# Patient Record
Sex: MALE | Race: BLACK OR AFRICAN AMERICAN | Employment: UNEMPLOYED | URBAN - METROPOLITAN AREA
[De-identification: names, ages, dates, MRNs, and addresses within clinical notes are randomized per-mention and may not be internally consistent; named-entity substitution may affect disease eponyms.]

---

## 2020-07-23 ENCOUNTER — HOSPITAL ENCOUNTER (EMERGENCY)
Age: 30
Discharge: HOME | End: 2020-07-24
Attending: EMERGENCY MEDICINE

## 2020-07-23 DIAGNOSIS — R46.89 AGGRESSION: Primary | ICD-10-CM

## 2020-07-23 LAB
AMPHET UR QL SCN: NEGATIVE
ANION GAP SERPL CALC-SCNC: 7 MMOL/L (ref 3–18)
BARBITURATES UR QL SCN: NEGATIVE
BASOPHILS # BLD: 0 K/UL (ref 0–0.1)
BASOPHILS NFR BLD: 0 % (ref 0–2)
BENZODIAZ UR QL: NEGATIVE
BUN SERPL-MCNC: 19 MG/DL (ref 7–18)
BUN/CREAT SERPL: 22 (ref 12–20)
CALCIUM SERPL-MCNC: 9.1 MG/DL (ref 8.5–10.1)
CANNABINOIDS UR QL SCN: NEGATIVE
CHLORIDE SERPL-SCNC: 107 MMOL/L (ref 100–111)
CO2 SERPL-SCNC: 26 MMOL/L (ref 21–32)
COCAINE UR QL SCN: NEGATIVE
COVID-19 RAPID TEST, COVR: NOT DETECTED
CREAT SERPL-MCNC: 0.88 MG/DL (ref 0.6–1.3)
DIFFERENTIAL METHOD BLD: NORMAL
EOSINOPHIL # BLD: 0 K/UL (ref 0–0.4)
EOSINOPHIL NFR BLD: 0 % (ref 0–5)
ERYTHROCYTE [DISTWIDTH] IN BLOOD BY AUTOMATED COUNT: 13.7 % (ref 11.6–14.5)
ETHANOL SERPL-MCNC: <3 MG/DL (ref 0–3)
GLUCOSE SERPL-MCNC: 91 MG/DL (ref 74–99)
HCT VFR BLD AUTO: 45 % (ref 36–48)
HDSCOM,HDSCOM: NORMAL
HGB BLD-MCNC: 15.4 G/DL (ref 13–16)
LYMPHOCYTES # BLD: 2 K/UL (ref 0.9–3.6)
LYMPHOCYTES NFR BLD: 23 % (ref 21–52)
MCH RBC QN AUTO: 29.8 PG (ref 24–34)
MCHC RBC AUTO-ENTMCNC: 34.2 G/DL (ref 31–37)
MCV RBC AUTO: 87 FL (ref 74–97)
METHADONE UR QL: NEGATIVE
MONOCYTES # BLD: 0.9 K/UL (ref 0.05–1.2)
MONOCYTES NFR BLD: 10 % (ref 3–10)
NEUTS SEG # BLD: 6 K/UL (ref 1.8–8)
NEUTS SEG NFR BLD: 67 % (ref 40–73)
OPIATES UR QL: NEGATIVE
PCP UR QL: NEGATIVE
PLATELET # BLD AUTO: 230 K/UL (ref 135–420)
PMV BLD AUTO: 9.4 FL (ref 9.2–11.8)
POTASSIUM SERPL-SCNC: 4.2 MMOL/L (ref 3.5–5.5)
RBC # BLD AUTO: 5.17 M/UL (ref 4.7–5.5)
SODIUM SERPL-SCNC: 140 MMOL/L (ref 136–145)
SOURCE, COVRS: NORMAL
WBC # BLD AUTO: 9 K/UL (ref 4.6–13.2)

## 2020-07-23 PROCEDURE — 87086 URINE CULTURE/COLONY COUNT: CPT

## 2020-07-23 PROCEDURE — 80048 BASIC METABOLIC PNL TOTAL CA: CPT

## 2020-07-23 PROCEDURE — 87635 SARS-COV-2 COVID-19 AMP PRB: CPT

## 2020-07-23 PROCEDURE — 99284 EMERGENCY DEPT VISIT MOD MDM: CPT

## 2020-07-23 PROCEDURE — 80307 DRUG TEST PRSMV CHEM ANLYZR: CPT

## 2020-07-23 PROCEDURE — 85025 COMPLETE CBC W/AUTO DIFF WBC: CPT

## 2020-07-23 NOTE — ED PROVIDER NOTES
EMERGENCY DEPARTMENT HISTORY AND PHYSICAL EXAM    4:24 PM      Date: 7/23/2020  Patient Name: Meghna Ryan    History of Presenting Illness     Chief Complaint   Patient presents with   [address] Problem         History Provided By: Patient, Police    Additional History (Context): Meghna Ryan is a 34 y.o. male with hx of ADD who presents via police custody under an ECO. Per documentation, mother notes patient has had increased aggression and has threatened her with knives. Patient denies incident occurring. Denies SI, HI, visual or auditory hallucinations. Denies alcohol or drug use. PCP: None      Past History     Past Medical History:  History reviewed. No pertinent past medical history. Past Surgical History:  History reviewed. No pertinent surgical history. Family History:  History reviewed. No pertinent family history. Social History:  Social History     Tobacco Use    Smoking status: Current Some Day Smoker     Packs/day: 0.25   Substance Use Topics    Alcohol use: Not Currently    Drug use: Not Currently       Allergies:  Not on File      Review of Systems       Review of Systems   Constitutional: Negative for chills and fever. Respiratory: Negative for shortness of breath. Cardiovascular: Negative for chest pain. Gastrointestinal: Negative for abdominal pain, nausea and vomiting. Skin: Negative for rash. Neurological: Negative for weakness. Psychiatric/Behavioral: Positive for agitation and behavioral problems. All other systems reviewed and are negative. Physical Exam     Visit Vitals  /78 (BP 1 Location: Left arm, BP Patient Position: Sitting)   Pulse 100   Temp 98.6 °F (37 °C)   Resp 18   Ht 6' (1.829 m)   Wt 83 kg (183 lb)   SpO2 100%   BMI 24.82 kg/m²         Physical Exam  Vitals signs and nursing note reviewed. Constitutional:       General: He is not in acute distress. Appearance: Normal appearance. He is well-developed.  He is not ill-appearing or diaphoretic. HENT:      Head: Normocephalic and atraumatic. Neck:      Musculoskeletal: Normal range of motion and neck supple. Cardiovascular:      Rate and Rhythm: Normal rate and regular rhythm. Heart sounds: Normal heart sounds. No murmur. No friction rub. No gallop. Pulmonary:      Effort: Pulmonary effort is normal. No respiratory distress. Breath sounds: Normal breath sounds. No wheezing or rales. Musculoskeletal: Normal range of motion. Skin:     General: Skin is warm. Findings: No rash. Neurological:      General: No focal deficit present. Mental Status: He is alert and oriented to person, place, and time. Cranial Nerves: Cranial nerves are intact. Sensory: Sensation is intact. Motor: Motor function is intact. Coordination: Coordination is intact. Psychiatric:         Attention and Perception: Attention normal.         Mood and Affect: Mood normal.         Speech: Speech normal.         Behavior: Behavior normal.         Thought Content: Thought content normal.           Diagnostic Study Results     Labs -  No results found for this or any previous visit (from the past 12 hour(s)). Radiologic Studies -   No orders to display         Medical Decision Making   I am the first provider for this patient. I reviewed the vital signs, available nursing notes, past medical history, past surgical history, family history and social history. Vital Signs-Reviewed the patient's vital signs. Records Reviewed: Nursing Notes and Old Medical Records (Time of Review: 4:24 PM)    ED Course: Progress Notes, Reevaluation, and Consults:  PROGRESS NOTE:  6:16 PM   Patient care will be transferred to [facility], NP. Discussed available diagnostic results and care plan at length. Pending [facility] consultation, placement. Written by Sanjay Sprague PA-C  1017: Pt in no acute distress. Pt able to answer all questions.   Reports he does not know why he is handcuffed and that he does not need to go to psychiatric unit. Police is at the bedside. Patient denies being suicidal, homicidal, depressed, denies hallucinations. Patient denies any physical complaints. [facility] found placement for patient at [facility]. Patient will be taking in police custody. Patient stable at this time. Diagnosis     Clinical Impression:   1. Aggression        Disposition: Transferred to [facility]. Follow-up Information    None          Patient's Medications    No medications on file       Dictation disclaimer:  Please note that this dictation was completed with [employer], the computer voice recognition software. Quite often unanticipated grammatical, syntax, homophones, and other interpretive errors are inadvertently transcribed by the computer software. Please disregard these errors. Please excuse any errors that have escaped final proofreading.

## 2020-07-24 VITALS
OXYGEN SATURATION: 100 % | TEMPERATURE: 98.1 F | HEIGHT: 72 IN | WEIGHT: 183 LBS | SYSTOLIC BLOOD PRESSURE: 118 MMHG | BODY MASS INDEX: 24.79 KG/M2 | DIASTOLIC BLOOD PRESSURE: 69 MMHG | HEART RATE: 66 BPM | RESPIRATION RATE: 15 BRPM

## 2020-07-25 LAB
BACTERIA SPEC CULT: NORMAL
SERVICE CMNT-IMP: NORMAL

## 2025-01-29 ENCOUNTER — HOSPITAL ENCOUNTER (EMERGENCY)
Facility: HOSPITAL | Age: 35
Discharge: HOME | End: 2025-01-31
Attending: EMERGENCY MEDICINE
Payer: COMMERCIAL

## 2025-01-29 DIAGNOSIS — F20.9 SCHIZOPHRENIA, UNSPECIFIED TYPE (HCC): ICD-10-CM

## 2025-01-29 DIAGNOSIS — F23 ACUTE PSYCHOSIS (HCC): Primary | ICD-10-CM

## 2025-01-29 PROCEDURE — 99282 EMERGENCY DEPT VISIT SF MDM: CPT

## 2025-01-29 PROCEDURE — 90791 PSYCH DIAGNOSTIC EVALUATION: CPT

## 2025-01-29 NOTE — ED TRIAGE NOTES
Patient brought in by police on an emergency custody order for psychotic behavior.  Patient currently refusing any medical care he would not let the physician examine him he would not allow vital signs to be taken or lab work or provide a urine sample.

## 2025-01-29 NOTE — ED NOTES
PT still refusing medical workup. PT educated on importance of medical clearance to move forward with psychiatric clearance. PT states \"can you show me the paperwork and the policies of the legalities of removing my bodily fluids from my body\". PT educated on his rights to refuse and subsequent delay in care.

## 2025-01-29 NOTE — ED NOTES
Per [facility]B, bed search cannot be initiated until PT is medically cleared. PT refusing medical exam, blood work, and denying all medical complaints. Unable to obtain vital signs since arrival. Crisis made aware. Nursing supervisor made aware. PT uninterested in medical care at this time.     TDO paperwork, filed by Eleanor Walker, alleges the patient was \"catatonic\" and \"refusal of services\" led to petition of TDO. PT ambulatory on arrival, speaking in full sentences. PT provided with food and juice while in emergency department.

## 2025-01-29 NOTE — ED NOTES
PT refusing medical workup, vital signs, medical care. PT demanding food, water, and a room. PT states he is not going to eat or drink anything until he is in a room instead of in the hallway.

## 2025-01-29 NOTE — ED PROVIDER NOTES
EMERGENCY DEPARTMENT HISTORY AND PHYSICAL EXAM      Patient Name: Jc Argueta  MRN: [id number]  YOB: 1990  Provider: Karely Donahue MD  PCP: None, None   Time/Date of evaluation: 5:23 AM EST on 1/29/25    History of Presenting Illness     Chief Complaint   Patient presents with    Mental Health Problem       History Provided By: Patient and Law Enforcement     History (Narative):   Jc Argueta is a 34 y.o. male with a PMHX of ADHD and prior episodes of aggression  who presents to the emergency department   via law enforcement  C/O under an E CO.  He is refusing to take his medications while he is in halfway.  His ECO paperwork also states that he has been catatonic.    At this time, the patient is not really forthcoming with information.  He is refusing vitals or blood work.            Past History     Past Medical History:  No past medical history on file.    Past Surgical History:  No past surgical history on file.    Family History:  No family history on file.    Social History:  Social History     Tobacco Use    Smoking status: Some Days     Current packs/day: 0.25     Types: Cigarettes   Substance Use Topics    Alcohol use: Not Currently    Drug use: Not Currently       Medications:  No current facility-administered medications for this encounter.     No current outpatient medications on file.       Allergies:  No Known Allergies    Social Determinants of Health:  Social Determinants of Health     Tobacco Use: Medium Risk (3/3/2022)    Received from [employer]    Patient History     Smoking Tobacco Use: Former     Smokeless Tobacco Use: Never     Passive Exposure: Not on file   Alcohol Use: Not on file   Financial Resource Strain: Not on file   Food Insecurity: Not on file   Transportation Needs: Not on file   Physical Activity: Not on file   Stress: Not on file   Social Connections: Not on file   Intimate Partner Violence: Not on file   Depression: Not on file   Housing Stability: Not

## 2025-01-30 NOTE — ED NOTES
Patient is awake and alert in no acute distress at this time patient continues to refuse all vital signs or labs.  Cuffed to the bed via left wrist still being held on a TDO.  Patient requesting food at times ate everything offered to patient.

## 2025-01-30 NOTE — ED NOTES
I received the patient in turnover from Dr. Brittanie man at the end of his shift.  The patient is a 34-year-old male with past medical history significant for schizoaffective disorder, who was brought to the ED last evening under an E CO.  He was arrested but the charges were dropped and the police brought him under the ED.  The E CO was taken out by [facility]B.  His E CO was said that he was catatonic.  However, the patient is refusing all care and is not willing to be fully examined or have a blood draw.  The patient is talking, eating, and able to ambulate.  He is not catatonic at this time.    We are really unable to offer anything else for the patient unless [facility]B is willing to see the patient without a full labs.    I spoke with the  present with the patient.  The patient now has a TDO that has been executed.  He has a bed at [facility].    I spoke with ED leadership.  The patient is young and healthy otherwise.  Based on the exam that I have been able to do, he is not in any acute distress.  He has been eating and drinking.    The patient is medically cleared.  He appears medically well and the risk of sedation in order to do any further testing outweighs the benefit.             Karely oDnahue MD  01/29/25 2008

## 2025-01-30 NOTE — VIRTUAL HEALTH
Jc Argueta  [id number]  1990     Social Work Behavioral Health Crisis Assessment    01/29/25    Chief Complaint: Mental Health Problem    HPI: Patient is a 34 y.o. Black /  male who presents for mental health problem. Patient presented to the ED on 01/29/25 from police custody.    Per EMR review of ED encounter, patient presents for \"C/O under an E CO.  He is refusing to take his medications while he is in assisted.  His ECO paperwork also states that he has been catatonic., He was arrested but the charges were dropped and the police brought him under the ED. The E CO was taken out by [facility]. His E CO was said that he was catatonic. However, the patient is refusing all care and is not willing to be fully examined or have a blood draw. The patient is talking, eating, and able to ambulate. He is not catatonic at this time,I spoke with the  present with the patient. The patient now has a TDO that has been executed. He has a bed at [facility]  \"  Per chart review, patient presented to the ED on 7/23/2020 for \"police custody under an ECO. Per documentation, mother notes patient has had increased aggression and has threatened her with knives. Patient denies incident occurring.\"  Per EMR review, patient has previous diagnosis of ADHD and schizoaffective.   Received consult for psychosis. Reviewed EMR. Writer met with patient who was agreeable to assessment. Patient was standing the entire assessment and appeared very paranoid. He was guarded and withdrawn with refusing to answer majority of questions. His answers were primarily one worded or short sentences.  He is unable to contract for safety due to refusing to answer questions. His mental health history is unknown due to him not wanting to discuss it. His insight and judgement are severely impaired.     Patient stated reason for ED encounter today is \"I am here under a TDO, I don't know a  issued the TDO its only for 24-72

## 2025-01-31 PROCEDURE — 94761 N-INVAS EAR/PLS OXIMETRY MLT: CPT

## 2025-01-31 NOTE — ED NOTES
Pt sitting at end of stretcher,  eating.  Pt questioning why he cannot have plastic bags in his room

## 2025-01-31 NOTE — BSMART NOTE
Crisis Note: On rounds, patient refused to speak with this writer. Patient is TDO'd and will attend  court later this morning. [employer] officer is with the patient; will assist as needed.

## 2025-01-31 NOTE — ED NOTES
I received the patient in turnover from Dr. Escudero at the end of her shift.  The patient is a 34-year-old male with a history of schizophrenia, who is acutely psychotic.  He is here under TDO.  He is awaiting admission at this time.  The patient is medically cleared.     Karely Donahue MD  01/30/25 0832

## 2025-01-31 NOTE — ED NOTES
Pt sitting on end of stretcher,   pt currently eating from bags of chik bridger. Pt refusing to answer my questions

## 2025-01-31 NOTE — ED NOTES
Attempted to get vitals signs, pt refusing, stating,\"nobody asked you to come in here and do that. I'm getting ready to go\"  Pt stated, \"goodbye\" when RN asked why he was opposed to vital signs.

## 2025-01-31 NOTE — ED NOTES
7:53 AM :Pt care assumed from Dr. Donahue , ED provider. Pt complaint(s), current treatment plan, progression and available diagnostic results have been discussed thoroughly. The patient was seen and evaluated on my shift.   Rounding occurred: Yes  Intended Disposition: Admission  Pending diagnostic reports and/or labs (please list): None    Patient remains under an E CO.  He continues to refuse labs.  He is pending placement by community service Board.

## 2025-01-31 NOTE — ED PROVIDER NOTES
11:50 PM : Pt care transferred to Dr. Donahue, ED provider. History of patient complaint(s), available diagnostic reports and current treatment plan has been discussed thoroughly.   Bedside rounding on patient occured : yes  Intended disposition of patient : MH transfer  Pending diagnostics reports and/or labs (please list): Evaluation by CSB    Maria E Agrawal MD, MPH  Emergency Medicine Physician      ED Course as of 01/30/25 2350   Wed Jan 29, 2025   0721 Eb to [facility] 33 yo male pmh of schizophrenia / arrested cc- catatonia/ pt refusing meds/ plan- pending evaluation by csb and em leadership  [KS]   Thu Jan 30, 2025   1842 Turn over from BLADIMIR Chin who was initially refusing labs. Now medically cleared awaiting evaluation by CSB. [LK]   2336 Patient care turned over to Dr. Donahue. Patient medically cleared and awaiting evaluation by CSB. [LK]      ED Course User Index  [KS] David Dickinson MD  [LK] Maria E Agrawal MD Kalodner, Lauren B, MD  01/30/25 2951

## 2025-01-31 NOTE — ED NOTES
7:15 AM:Pt care assumed from Dr. Donahue , ED provider. Pt complaint(s), current treatment plan, progression and available diagnostic results have been discussed thoroughly. The patient was seen and evaluated on my shift.   Rounding occurred: Yes  Intended Disposition: Placement  Pending diagnostic reports and/or labs (please list): None    Patient resting comfortably on stretcher in no acute distress.   at bedside.    I counseled the patient on the possibility of obtaining labs to expedite his placement.  He continues to refuse any lab draws.    Patient has been medically cleared for behavioral assessment and placement by [facility] service Board.      12 PM patient resting comfortably on stretcher in no acute distress.    7:16 PM : Pt care transferred to Dr. Maria E Agrawal,ED provider. History of patient complaint(s), available diagnostic reports and current treatment plan has been discussed thoroughly.   Bedside rounding on patient occured : Yes .  Intended disposition of patient : Placement  Pending diagnostics reports and/or labs (please list): None         Arnulfo Rankin DO  01/30/25 1918

## 2025-01-31 NOTE — BH NOTE
Court hearing held for pt 01/31/2025. Court dismissed petition for involuntary commitment.     -Bi Pacheco,

## 2025-02-23 ENCOUNTER — HOSPITAL ENCOUNTER (EMERGENCY)
Facility: HOSPITAL | Age: 35
Discharge: LEFT BEFORE DISCHARGE | End: 2025-02-26
Attending: STUDENT IN AN ORGANIZED HEALTH CARE EDUCATION/TRAINING PROGRAM
Payer: COMMERCIAL

## 2025-02-23 DIAGNOSIS — F29 PSYCHOSIS, UNSPECIFIED PSYCHOSIS TYPE (HCC): Primary | ICD-10-CM

## 2025-02-23 PROCEDURE — 99281 EMR DPT VST MAYX REQ PHY/QHP: CPT

## 2025-02-23 NOTE — ED TRIAGE NOTES
Patient denies any complaints. Patient brought in by police as an ECO. Patient denies SI/HI. Patient appears unkept. Patient refusing all medical treatment.

## 2025-02-24 PROCEDURE — 94761 N-INVAS EAR/PLS OXIMETRY MLT: CPT

## 2025-02-24 NOTE — ED PROVIDER NOTES
ED continued care note    5:36 PM EST  Signed out to me by Dr. Dickinson, patient here with psychosis on a TDO.  Refusing all medical care including vital signs and lab work.  Administration aware    Reviewed at 5:36 PM EST  Patient Vitals for the past 12 hrs:   Resp   02/24/25 1025 18       ED Course as of 02/26/25 0656   Sun Feb 23, 2025   1909 Patient in no distress at the time of evaluation and refusing all medical interventions.  No medical TDO in place though there is a psychiatric TDO which has been reported to me as having been supported by the [facility].  However given the lack of evidence of emergent medical pathology will not obtain blood or urine against the patient's will. [CM]   Mon Feb 24, 2025   0623 Cm to [facility]s 33 yo male pmh of schizophrenia cc- failure to thrive ( unkempt) / refusing medical care at this time/ plan- pending re-evaluation.  [KS]   1219 Went to bedside.  Patient refusing medical screening exam at this time.  Patient is of sound mind, has capacity.  Will continue to evaluate. [KS]   1805 I spent a few minutes talking to the patient seeing if he needs anything he did not say a word to me nor make eye contact in regard me.  He demonstrates purposeful behavior eating his meal tray, very coordinated and goal directed about this.  It is possible he does have some psychosis elements.  No dangerous behavior witnessed right now discussed with the charge nurse to get vital signs and clearance labs. [CB]   Tue Feb 25, 2025   0638 H/o schizophrenia, TDO; medically cleared and pending placement. [LK]   Wed Feb 26, 2025   0656 Patient here on a temporary detainment order with psychosis not cooperative with any assessment.  No acute problems recently [CB]      ED Course User Index  [CB] Duy Ricks MD  [CM] Ronald Magallanes III, MD  [KS] David Dickinson MD  [LK] Alex Bear MD       Clinical Impression:   1. Psychosis, unspecified psychosis type (HCC)         Duy Ricks,

## 2025-02-24 NOTE — ED NOTES
Patient remains sitting up on stretcher, left hand cuffed. Officer at bedside. Patient continues to refuse to have anything done.

## 2025-02-24 NOTE — ED PROVIDER NOTES
EMERGENCY DEPARTMENT HISTORY AND PHYSICAL EXAM    7:09 PM      Date: 2/23/2025  Patient Name: Jc Argueta    History of Presenting Illness     Chief Complaint   Patient presents with    Mental Health Problem         History Provided By: Patient    Additional History (Context): Jc Argueta is a 34 y.o. male with No past medical history on file.but with reported past psychiatric history of schizophrenia presenting to the emergency department with a chief complaint of ECO then supported TDO.  Patient states he was at his apartment today when his mother came over for him to give her some money for [employer], he was then informed that [facility] had taken out and ECO against him because he smelled like urine.    PCP: None, None    No current facility-administered medications for this encounter.     No current outpatient medications on file.       Past History     Past Medical History:  No past medical history on file.    Past Surgical History:  No past surgical history on file.    Family History:  No family history on file.    Social History:  Social History     Tobacco Use    Smoking status: Some Days     Current packs/day: 0.25     Types: Cigarettes   Substance Use Topics    Alcohol use: Not Currently    Drug use: Not Currently       Allergies:  No Known Allergies      Review of Systems       Review of Systems   Constitutional:  Negative for chills and fever.   HENT:  Negative for rhinorrhea.    Eyes:  Negative for photophobia and visual disturbance.   Respiratory:  Negative for cough and shortness of breath.    Cardiovascular:  Negative for chest pain and palpitations.   Gastrointestinal:  Negative for nausea and vomiting.   Genitourinary:  Negative for dysuria.   Musculoskeletal:  Negative for neck pain and neck stiffness.   Skin:  Negative for rash and wound.   Neurological:  Negative for syncope and weakness.   Psychiatric/Behavioral:  Negative for suicidal ideas.          Physical Exam   There were no vitals  and other interpretive errors are inadvertently transcribed by the computer software.  Please disregard these errors.  Please excuse any errors that have escaped final proofreading.          Ronald Magallanes III, MD  02/25/25 5530

## 2025-02-24 NOTE — ED NOTES
Pt given lunch tray.  Pt refused vital signs and labs or to change into paper scrubs.  Pt states he is not here for medical care.  Pt guarded and uncooperative.

## 2025-02-24 NOTE — ED NOTES
Patient taken to bathroom by officer. Patient refusing to give urine sample. Patient continues to refuse blood draw and vital signs.

## 2025-02-24 NOTE — ED PROVIDER NOTES
ED continued care note    5:36 PM EST  Signed out to me by Dr. Dickinson, patient here with psychosis on a TDO.  Refusing all medical care including vital signs and lab work.  Administration aware    Reviewed at 5:36 PM EST  Patient Vitals for the past 12 hrs:   Resp   02/24/25 1025 18       ED Course as of 02/24/25 1736   Sun Feb 23, 2025   1909 Patient in no distress at the time of evaluation and refusing all medical interventions.  No medical TDO in place though there is a psychiatric TDO which has been reported to me as having been supported by the [facility].  However given the lack of evidence of emergent medical pathology will not obtain blood or urine against the patient's will. [CM]   Mon Feb 24, 2025   0623 Cm to [facility] 35 yo male pmh of schizophrenia cc- failure to thrive ( unkempt) / refusing medical care at this time/ plan- pending re-evaluation.  [KS]   1219 Went to bedside.  Patient refusing medical screening exam at this time.  Patient is of sound mind, has capacity.  Will continue to evaluate. [KS]      ED Course User Index  [CM] Ronald Magallanes III, MD  [KS] David Dickinson MD       Clinical Impression:   1. Psychosis, unspecified psychosis type (HCC)         Duy Ricks MD  02/24/25 1737

## 2025-02-24 NOTE — ED NOTES
Approached patient to obtain vital signs however patient refused.  Pt started yelling for officer stated he was being harassed.  Pt states \"I am in the custody of the officer and refusing all care\".  Per night staff patient refused  vital signs and labs.  Pt not very well groomed and smells of urine.

## 2025-02-24 NOTE — ED NOTES
Assumed care of patient. Patient sitting up on stretcher, left hand cuffed. Patient refusing vitals, lab draw and urine sample.

## 2025-02-24 NOTE — BSMART NOTE
Crisis note:    Discussed patient with on call psychiatrist Dr. Jha. Dr. Jha states he is unable to accept a patient that is not been medically cleared.

## 2025-02-25 VITALS — RESPIRATION RATE: 18 BRPM

## 2025-02-25 NOTE — ED NOTES
Pt request a nurse at the bedside.  Pt requested a turkey sandwiches with matt, 2 pepsi and a bag a chips. Pt notified there were no chips and pepsi however offered juice and peanut butter sandwich until breakfast came.  Pt refused juice and sandwich at this time.  Pt also refused vital signs and labs stating he was not here for medical care and was just here for his mental health.  Pt states he wants to bypass medical and go to psych.

## 2025-02-25 NOTE — ED NOTES
Patient awake, sitting on stretcher, continues to refuse urine, blood and vitals. Officer at bedside. Will continue to monitor.

## 2025-02-25 NOTE — ED NOTES
The patient is an otherwise healthy 34-year-old male.  The last time we had this patient in the ED, we medically cleared him without labs because we have a low suspicion for any underlying metabolic abnormalities.  The patient has been here for 35 hours and 49 minutes.  He would have cleared any psychoactive substances or alcohol.  The patient is medically cleared for psychological evaluation and inpatient psychiatric admission if necessary.     Karely Donahue MD  02/25/25 0658

## 2025-02-25 NOTE — ED NOTES
Bedside and Verbal shift change report given to Kurtis (oncoming nurse) by Lynette (offgoing nurse). Report included the following information ED Encounter Summary, ED SBAR, and MAR.

## 2025-02-25 NOTE — ED PROVIDER NOTES
8:28 PM : Pt care assumed from Dr. Donahue  ,ED provider. History of patient complaint(s), available diagnostic reports and current treatment plan has been discussed thoroughly.   Medically cleared Yes  Status: TDO  Intended disposition of patient : placement by [facility]  Pending diagnostics reports and/or labs (please list): none    No results found for this or any previous visit (from the past 12 hour(s)).        ED Course as of 02/25/25 2028   Sun Feb 23, 2025   1909 Patient in no distress at the time of evaluation and refusing all medical interventions.  No medical TDO in place though there is a psychiatric TDO which has been reported to me as having been supported by the [facility].  However given the lack of evidence of emergent medical pathology will not obtain blood or urine against the patient's will. [CM]   Mon Feb 24, 2025   0623 Cm to ks 35 yo male [facility] of schizophrenia cc- failure to thrive ( unkempt) / refusing medical care at this time/ plan- pending re-evaluation.  [KS]   1219 Went to bedside.  Patient refusing medical screening exam at this time.  Patient is of sound mind, has capacity.  Will continue to evaluate. [KS]   1805 I spent a few minutes talking to the patient seeing if he needs anything he did not say a word to me nor make eye contact in regard me.  He demonstrates purposeful behavior eating his meal tray, very coordinated and goal directed about this.  It is possible he does have some psychosis elements.  No dangerous behavior witnessed right now discussed with the charge nurse to get vital signs and clearance labs. [CB]   Tue Feb 25, 2025   0638 H/o schizophrenia, TDO; medically cleared and pending placement. [LK]      ED Course User Index  [CB] Duy Ricks MD  [CM] Ronald Magallanes III, MD  [KS] David Dickinson MD  [LK] Alex Bear MD Kitchen, Levi K, MD  02/25/25 2028

## 2025-02-25 NOTE — ED NOTES
Pt sitting up on side of bed with strong smell of urine noted.  Pt repeatedly urinating on himself and refusing to go to the restroom.

## 2025-02-25 NOTE — ED NOTES
I received the patient in turnover from Dr. Ricks at the end of his shift.  The patient is a 34-year-old male who presented to the ED with acute psychosis.  He is under TDO at this time.  He is refusing all lab work.  At this point we will have administration make the best determination as to how to proceed.     Karely Donahue MD  02/24/25 5852

## 2025-02-25 NOTE — ED NOTES
Attempted to obtain vital signs from pt.  Pt refused.  Officer in room to encourage pt to give vitals.  Pt told officer \"I already told the officers I am not consenting to any medical exam

## 2025-02-25 NOTE — BSMART NOTE
Crisis note:    Discussed patient with on call psychiatrist Dr. Morrell. Per Dr. Morrell unable to consider patient as he is not allowing medical clearance. Need medical clearance labs, VS, etc. To consider.

## 2025-02-25 NOTE — BSMART NOTE
Crisis note:    [facility] informed that Ms. Elaine director of emergency services has been in contact with the [facility] admission coordinator regarding patient and wait list. Patient is currently on a TDO and his committal hearing will be tomorrow morning.

## 2025-02-25 NOTE — ED NOTES
Pt requested a pepsi with a cup and a top.  Pt offered and diet pepsi however refused stating \"I will get pepsi from another nurse later\". Pt then proceeded to tell this RN to get out because he was done talking. Upon leaving room patient called for nurse again asking for ginger ale. Pt  offered a ginger ale. Pt still refusing vital signs and labs.  Pt yesterday threaten to have anyone arrested that attempted to touch.

## 2025-02-26 PROCEDURE — 94761 N-INVAS EAR/PLS OXIMETRY MLT: CPT

## 2025-02-26 NOTE — BH NOTE
Court hearing held for pt 02/26/2025. Court ruled pt to be involuntarily committed to any accepting facility.     -Bi Pacheco,

## 2025-02-26 NOTE — ED NOTES
11 PM:Pt care assumed from Dr. Brannon , ED provider. Pt complaint(s), current treatment plan, progression and available diagnostic results have been discussed thoroughly. The patient was seen and evaluated on my shift.   Rounding occurred: Yes  Intended Disposition: Pending placement  Pending diagnostic reports and/or labs (please list): Labs, for medical clearance      Patient is resting comfortably on stretcher with  outside of the room.  He continues to refuse any lab work to help with this placement.    6:34 AM : Pt care transferred to Dr. Ricks  ,ED provider. History of patient complaint(s), available diagnostic reports and current treatment plan has been discussed thoroughly.   Bedside rounding on patient occured : yes .  Intended disposition of patient : Pending  Pending diagnostics reports and/or labs (please list): Labs, placement by [facility]    Diagnosis:   1. Psychosis, unspecified psychosis type (HCC)          Disposition:    DISPOSITION               Home or Self Care     [unfilled]      Arnulfo Rankin DO  02/26/25 0638

## 2025-02-26 NOTE — BSMART NOTE
[facility] [phone] bed search is in progress for patient. Reportedly, patient is on the [facility] wait list. Patient will also attend  court this am, 2/26/25. Crisis will assist as needed.

## 2025-08-09 ENCOUNTER — HOSPITAL ENCOUNTER (EMERGENCY)
Facility: HOSPITAL | Age: 35
Discharge: HOME | End: 2025-08-09
Attending: STUDENT IN AN ORGANIZED HEALTH CARE EDUCATION/TRAINING PROGRAM
Payer: COMMERCIAL

## 2025-08-09 VITALS
DIASTOLIC BLOOD PRESSURE: 79 MMHG | SYSTOLIC BLOOD PRESSURE: 130 MMHG | OXYGEN SATURATION: 100 % | RESPIRATION RATE: 18 BRPM | WEIGHT: 150 LBS | TEMPERATURE: 97.8 F | HEART RATE: 78 BPM

## 2025-08-09 DIAGNOSIS — Z53.1: ICD-10-CM

## 2025-08-09 DIAGNOSIS — Z00.8 ENCOUNTER FOR MEDICAL ASSESSMENT: Primary | ICD-10-CM

## 2025-08-09 PROCEDURE — 94761 N-INVAS EAR/PLS OXIMETRY MLT: CPT

## 2025-08-09 PROCEDURE — 99282 EMERGENCY DEPT VISIT SF MDM: CPT

## 2025-08-10 ENCOUNTER — HOSPITAL ENCOUNTER (EMERGENCY)
Facility: HOSPITAL | Age: 35
Discharge: HOME | End: 2025-08-10
Attending: STUDENT IN AN ORGANIZED HEALTH CARE EDUCATION/TRAINING PROGRAM
Payer: COMMERCIAL

## 2025-08-10 VITALS
DIASTOLIC BLOOD PRESSURE: 96 MMHG | HEART RATE: 85 BPM | SYSTOLIC BLOOD PRESSURE: 160 MMHG | TEMPERATURE: 98.4 F | WEIGHT: 183 LBS | HEIGHT: 71 IN | OXYGEN SATURATION: 100 % | RESPIRATION RATE: 16 BRPM | BODY MASS INDEX: 25.62 KG/M2

## 2025-08-10 DIAGNOSIS — Z00.8 MEDICAL CLEARANCE FOR PSYCHIATRIC ADMISSION: Primary | ICD-10-CM

## 2025-08-10 LAB
ALBUMIN SERPL-MCNC: 4 G/DL (ref 3.4–5)
ALBUMIN/GLOB SERPL: 1.3 (ref 0.8–1.7)
ALP SERPL-CCNC: 69 U/L (ref 45–117)
ALT SERPL-CCNC: 9 U/L (ref 10–50)
AMPHET UR QL SCN: NEGATIVE
ANION GAP SERPL CALC-SCNC: 12 MMOL/L (ref 3–18)
AST SERPL-CCNC: 16 U/L (ref 10–38)
BARBITURATES UR QL SCN: NEGATIVE
BASOPHILS # BLD: 0.01 K/UL (ref 0–0.1)
BASOPHILS NFR BLD: 0.1 % (ref 0–2)
BENZODIAZ UR QL: NEGATIVE
BILIRUB SERPL-MCNC: 0.5 MG/DL (ref 0.2–1)
BUN SERPL-MCNC: 10 MG/DL (ref 6–23)
BUN/CREAT SERPL: 16 (ref 12–20)
CALCIUM SERPL-MCNC: 9.7 MG/DL (ref 8.5–10.1)
CANNABINOIDS UR QL SCN: NEGATIVE
CHLORIDE SERPL-SCNC: 102 MMOL/L (ref 98–107)
CO2 SERPL-SCNC: 26 MMOL/L (ref 21–32)
COCAINE UR QL SCN: NEGATIVE
CREAT SERPL-MCNC: 0.65 MG/DL (ref 0.6–1.3)
DIFFERENTIAL METHOD BLD: ABNORMAL
EOSINOPHIL # BLD: 0.06 K/UL (ref 0–0.4)
EOSINOPHIL NFR BLD: 0.9 % (ref 0–5)
ERYTHROCYTE [DISTWIDTH] IN BLOOD BY AUTOMATED COUNT: 13.2 % (ref 11.6–14.5)
ETHANOL SERPL-MCNC: <11 MG/DL (ref 0–0.08)
FENTANYL: NEGATIVE
GLOBULIN SER CALC-MCNC: 3.1 G/DL (ref 2–4)
GLUCOSE SERPL-MCNC: 82 MG/DL (ref 74–108)
HCT VFR BLD AUTO: 43.8 % (ref 36–48)
HGB BLD-MCNC: 14 G/DL (ref 13–16)
IMM GRANULOCYTES # BLD AUTO: 0.01 K/UL (ref 0–0.04)
IMM GRANULOCYTES NFR BLD AUTO: 0.1 % (ref 0–0.5)
LYMPHOCYTES # BLD: 1.83 K/UL (ref 0.9–3.6)
LYMPHOCYTES NFR BLD: 27.3 % (ref 21–52)
Lab: NORMAL
MCH RBC QN AUTO: 28.6 PG (ref 24–34)
MCHC RBC AUTO-ENTMCNC: 32 G/DL (ref 31–37)
MCV RBC AUTO: 89.4 FL (ref 78–100)
METHADONE UR QL: NEGATIVE
MONOCYTES # BLD: 0.62 K/UL (ref 0.05–1.2)
MONOCYTES NFR BLD: 9.3 % (ref 3–10)
NEUTS SEG # BLD: 4.17 K/UL (ref 1.8–8)
NEUTS SEG NFR BLD: 62.3 % (ref 40–73)
NRBC # BLD: 0 K/UL (ref 0–0.01)
NRBC BLD-RTO: 0 PER 100 WBC
OPIATES UR QL: NEGATIVE
OXYCODONE UR QL SCN: NEGATIVE
PLATELET # BLD AUTO: 257 K/UL (ref 135–420)
PMV BLD AUTO: 9 FL (ref 9.2–11.8)
POTASSIUM SERPL-SCNC: 4.3 MMOL/L (ref 3.5–5.5)
PROT SERPL-MCNC: 7.1 G/DL (ref 6.4–8.2)
RBC # BLD AUTO: 4.9 M/UL (ref 4.35–5.65)
SODIUM SERPL-SCNC: 140 MMOL/L (ref 136–145)
WBC # BLD AUTO: 6.7 K/UL (ref 4.6–13.2)

## 2025-08-10 PROCEDURE — 80307 DRUG TEST PRSMV CHEM ANLYZR: CPT

## 2025-08-10 PROCEDURE — 82077 ASSAY SPEC XCP UR&BREATH IA: CPT

## 2025-08-10 PROCEDURE — 80053 COMPREHEN METABOLIC PANEL: CPT

## 2025-08-10 PROCEDURE — 85025 COMPLETE CBC W/AUTO DIFF WBC: CPT

## 2025-08-10 PROCEDURE — 90792 PSYCH DIAG EVAL W/MED SRVCS: CPT | Performed by: NURSE PRACTITIONER

## 2025-08-10 PROCEDURE — 99283 EMERGENCY DEPT VISIT LOW MDM: CPT

## 2025-08-10 ASSESSMENT — LIFESTYLE VARIABLES
HOW OFTEN DO YOU HAVE A DRINK CONTAINING ALCOHOL: PATIENT DECLINED
HOW MANY STANDARD DRINKS CONTAINING ALCOHOL DO YOU HAVE ON A TYPICAL DAY: PATIENT DECLINED